# Patient Record
Sex: FEMALE | Race: WHITE | NOT HISPANIC OR LATINO | Employment: OTHER | URBAN - METROPOLITAN AREA
[De-identification: names, ages, dates, MRNs, and addresses within clinical notes are randomized per-mention and may not be internally consistent; named-entity substitution may affect disease eponyms.]

---

## 2017-01-02 DIAGNOSIS — J44.9 CHRONIC OBSTRUCTIVE PULMONARY DISEASE, UNSPECIFIED COPD TYPE: ICD-10-CM

## 2017-01-03 ENCOUNTER — TELEPHONE (OUTPATIENT)
Dept: RADIOLOGY | Facility: HOSPITAL | Age: 82
End: 2017-01-03

## 2017-01-03 ENCOUNTER — HOSPITAL ENCOUNTER (OUTPATIENT)
Dept: RADIOLOGY | Facility: HOSPITAL | Age: 82
Discharge: HOME | End: 2017-01-03
Attending: FAMILY MEDICINE
Payer: MEDICARE

## 2017-01-03 DIAGNOSIS — R53.1 WEAKNESS: ICD-10-CM

## 2017-01-03 DIAGNOSIS — R41.3 MEMORY DIFFICULTIES: ICD-10-CM
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
## 2017-01-03 PROCEDURE — 70450 CT HEAD/BRAIN W/O DYE: CPT | Mod: 26,,, | Performed by: RADIOLOGY

## 2017-01-03 PROCEDURE — 70450 CT HEAD/BRAIN W/O DYE: CPT | Mod: TC,PO

## 2017-01-04 ENCOUNTER — HOSPITAL ENCOUNTER (OUTPATIENT)
Dept: RADIOLOGY | Facility: HOSPITAL | Age: 82
Discharge: HOME | End: 2017-01-04
Attending: FAMILY MEDICINE
Payer: MEDICARE

## 2017-01-04 DIAGNOSIS — R53.1 WEAKNESS: ICD-10-CM
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
## 2017-01-04 DIAGNOSIS — R09.89 BRUIT: ICD-10-CM

## 2017-01-04 PROCEDURE — 93880 EXTRACRANIAL BILAT STUDY: CPT | Mod: TC,PO

## 2017-01-04 PROCEDURE — 93880 EXTRACRANIAL BILAT STUDY: CPT | Mod: 26,,, | Performed by: RADIOLOGY

## 2017-01-05 ENCOUNTER — TELEPHONE (OUTPATIENT)
Dept: FAMILY MEDICINE | Facility: CLINIC | Age: 82
End: 2017-01-05

## 2017-01-05 DIAGNOSIS — R41.3 MEMORY DIFFICULTIES: Primary | ICD-10-CM

## 2017-01-05 RX ORDER — MINERAL OIL
ENEMA (ML) RECTAL
Refills: 0 | OUTPATIENT
Start: 2017-01-05

## 2017-01-05 NOTE — TELEPHONE ENCOUNTER
"----- Message from Refugio Heck MD sent at 1/3/2017  4:18 PM CST -----  She does have chronic change that are at the "upper limits" (of what would be expected) for her at her age.  Her smoking history could have caused some of this.  She did not have an acute event like a stroke so I do not feel that further testing with an MRI is needed at this moment to rule out an acute stroke.  She had been having troubles before our recent visit.  I do, however, recommend that she have an evaluation by a neurologist for further workup of her falling and instability.  Please put in a referral and book the appt for her.  "

## 2017-01-27 ENCOUNTER — OFFICE VISIT (OUTPATIENT)
Dept: FAMILY MEDICINE | Facility: CLINIC | Age: 82
End: 2017-01-27
Payer: MEDICARE

## 2017-01-27 VITALS
OXYGEN SATURATION: 98 % | TEMPERATURE: 98 F | HEART RATE: 65 BPM | SYSTOLIC BLOOD PRESSURE: 138 MMHG | HEIGHT: 63 IN | WEIGHT: 128.31 LBS | DIASTOLIC BLOOD PRESSURE: 78 MMHG | BODY MASS INDEX: 22.73 KG/M2

## 2017-01-27 DIAGNOSIS — I10 ESSENTIAL HYPERTENSION: ICD-10-CM

## 2017-01-27 DIAGNOSIS — I48.0 PAROXYSMAL ATRIAL FIBRILLATION: ICD-10-CM
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
## 2017-01-27 PROCEDURE — G0008 ADMIN INFLUENZA VIRUS VAC: HCPCS | Mod: PBBFAC,PO | Performed by: FAMILY MEDICINE

## 2017-01-27 PROCEDURE — 99213 OFFICE O/P EST LOW 20 MIN: CPT | Mod: PBBFAC,PO | Performed by: FAMILY MEDICINE

## 2017-01-27 PROCEDURE — 99999 PR PBB SHADOW E&M-EST. PATIENT-LVL III: CPT | Mod: PBBFAC,,, | Performed by: FAMILY MEDICINE

## 2017-01-27 PROCEDURE — 99213 OFFICE O/P EST LOW 20 MIN: CPT | Mod: S$PBB,,, | Performed by: FAMILY MEDICINE

## 2017-01-27 NOTE — PROGRESS NOTES
"Subjective:      Patient ID: Edgardo Ashley is a 85 y.o. female.    Chief Complaint: Follow-up    HPI she is here to f/u on testing for her falls and memory.  She has not had a fall in the last month.   She had a workup.     The carotid U/S and holter were neg.     The CT showed chronic changes.      The patient presents with essential hypertension.  The patient is tolerating the medication well and is in excellent compliance.  The patient is experiencing no side effects.  Counseling was offered regarding low salt diets.  The patient has a reduced salt intake.  The patient denies chest pain, palpitations, shortness of breath, dyspnea on exertion, left or murmur neck pain, nausea, vomiting, diaphoresis, paroxysmal nocturnal dyspnea, and orthopnea.     Visit Vitals    /78    Pulse 65    Temp 98.1 °F (36.7 °C) (Oral)    Ht 5' 3" (1.6 m)    Wt 58.2 kg (128 lb 4.9 oz)    SpO2 98%    BMI 22.73 kg/m2       Review of Systems    Objective:     Visit Vitals    /78    Pulse 65    Temp 98.1 °F (36.7 °C) (Oral)    Ht 5' 3" (1.6 m)    Wt 58.2 kg (128 lb 4.9 oz)    SpO2 98%    BMI 22.73 kg/m2       Physical Exam   Constitutional: She appears well-developed and well-nourished. No distress.   Cardiovascular: Normal rate, regular rhythm and intact distal pulses.  Exam reveals no gallop and no friction rub.    Murmur heard.  Pulmonary/Chest: Effort normal and breath sounds normal. No respiratory distress. She has no wheezes. She has no rales.   Musculoskeletal: She exhibits no edema.   Skin: She is not diaphoretic.       Assessment:     1. Fall, subsequent encounter    2. Paroxysmal atrial fibrillation    3. Essential hypertension        Plan:   Edgardo was seen today for follow-up.    Diagnoses and all orders for this visit:    Fall, subsequent encounter    Paroxysmal atrial fibrillation    Essential hypertension    Other orders  -     Influenza - High Dose (65+) (PF) (IM)      She is going to think about going " to PT for balance and strengthening but she does not want to do that now.

## 2017-03-30 ENCOUNTER — LAB VISIT (OUTPATIENT)
Dept: LAB | Facility: HOSPITAL | Age: 82
End: 2017-03-30
Attending: FAMILY MEDICINE
Payer: MEDICARE

## 2017-03-30 DIAGNOSIS — E78.5 HYPERLIPIDEMIA, UNSPECIFIED HYPERLIPIDEMIA TYPE: ICD-10-CM

## 2017-03-30 LAB
ALBUMIN SERPL BCP-MCNC: 3.6 G/DL
ALP SERPL-CCNC: 99 U/L
ALT SERPL W/O P-5'-P-CCNC: 13 U/L
AST SERPL-CCNC: 20 U/L
BILIRUB DIRECT SERPL-MCNC: 0.3 MG/DL
BILIRUB SERPL-MCNC: 0.8 MG/DL
CHOLEST/HDLC SERPL: 3.4 {RATIO}
HDL/CHOLESTEROL RATIO: 29.4 %
HDLC SERPL-MCNC: 143 MG/DL
HDLC SERPL-MCNC: 42 MG/DL
LDLC SERPL CALC-MCNC: 78.8 MG/DL
NONHDLC SERPL-MCNC: 101 MG/DL
PROT SERPL-MCNC: 6.5 G/DL
TRIGL SERPL-MCNC: 111 MG/DL

## 2017-03-30 PROCEDURE — 36415 COLL VENOUS BLD VENIPUNCTURE: CPT | Mod: PO

## 2017-03-30 PROCEDURE — 80061 LIPID PANEL: CPT

## 2017-03-30 PROCEDURE — 80076 HEPATIC FUNCTION PANEL: CPT

## 2017-04-03 NOTE — PROGRESS NOTES
The patient's lipid and liver are at a controlled target on the labs that I reviewed.   Repeat a LIPID AND LIVER PROFILE in 12 months.  Refill the lipid medications for a year for the patient.

## 2017-05-15 DIAGNOSIS — J44.9 CHRONIC OBSTRUCTIVE PULMONARY DISEASE, UNSPECIFIED COPD TYPE: ICD-10-CM

## 2017-05-15 RX ORDER — MINERAL OIL
180 ENEMA (ML) RECTAL DAILY
Qty: 90 TABLET | Refills: 3 | Status: SHIPPED | OUTPATIENT
Start: 2017-05-15 | End: 2017-05-24 | Stop reason: SDUPTHER

## 2017-05-24 DIAGNOSIS — J44.9 CHRONIC OBSTRUCTIVE PULMONARY DISEASE, UNSPECIFIED COPD TYPE: ICD-10-CM

## 2017-05-24 RX ORDER — MINERAL OIL
180 ENEMA (ML) RECTAL DAILY
Qty: 90 TABLET | Refills: 3 | Status: SHIPPED | OUTPATIENT
Start: 2017-05-24 | End: 2018-05-29 | Stop reason: SDUPTHER

## 2017-06-14 DIAGNOSIS — I48.0 PAROXYSMAL ATRIAL FIBRILLATION: ICD-10-CM

## 2017-06-14 RX ORDER — DIGOXIN 250 MCG
TABLET ORAL
Qty: 90 TABLET | Refills: 2 | Status: SHIPPED | OUTPATIENT
Start: 2017-06-14 | End: 2017-12-27 | Stop reason: SDUPTHER

## 2017-10-18 DIAGNOSIS — E78.5 HYPERLIPIDEMIA, UNSPECIFIED HYPERLIPIDEMIA TYPE: ICD-10-CM

## 2017-10-18 RX ORDER — LOVASTATIN 20 MG/1
TABLET ORAL
Qty: 90 TABLET | Refills: 1 | Status: SHIPPED | OUTPATIENT
Start: 2017-10-18 | End: 2017-12-27 | Stop reason: SDUPTHER

## 2017-12-27 ENCOUNTER — OFFICE VISIT (OUTPATIENT)
Dept: FAMILY MEDICINE | Facility: CLINIC | Age: 82
End: 2017-12-27
Payer: MEDICARE

## 2017-12-27 ENCOUNTER — LAB VISIT (OUTPATIENT)
Dept: LAB | Facility: HOSPITAL | Age: 82
End: 2017-12-27
Attending: FAMILY MEDICINE
Payer: MEDICARE

## 2017-12-27 VITALS
BODY MASS INDEX: 22.4 KG/M2 | SYSTOLIC BLOOD PRESSURE: 128 MMHG | HEIGHT: 64 IN | DIASTOLIC BLOOD PRESSURE: 82 MMHG | HEART RATE: 55 BPM | TEMPERATURE: 98 F | WEIGHT: 131.19 LBS

## 2017-12-27 DIAGNOSIS — N18.30 CKD (CHRONIC KIDNEY DISEASE), STAGE III: ICD-10-CM

## 2017-12-27 DIAGNOSIS — J44.9 CHRONIC OBSTRUCTIVE PULMONARY DISEASE, UNSPECIFIED COPD TYPE: ICD-10-CM

## 2017-12-27 DIAGNOSIS — E55.9 VITAMIN D INSUFFICIENCY: ICD-10-CM

## 2017-12-27 DIAGNOSIS — I48.0 PAROXYSMAL ATRIAL FIBRILLATION: ICD-10-CM

## 2017-12-27 DIAGNOSIS — I10 ESSENTIAL HYPERTENSION: ICD-10-CM

## 2017-12-27 DIAGNOSIS — E78.5 HYPERLIPIDEMIA, UNSPECIFIED HYPERLIPIDEMIA TYPE: ICD-10-CM

## 2017-12-27 DIAGNOSIS — M81.0 OSTEOPOROSIS, SENILE: Primary | ICD-10-CM
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
## 2017-12-27 DIAGNOSIS — M81.0 OSTEOPOROSIS, SENILE: ICD-10-CM

## 2017-12-27 LAB
25(OH)D3+25(OH)D2 SERPL-MCNC: 24 NG/ML
ALBUMIN SERPL BCP-MCNC: 3.5 G/DL
ALP SERPL-CCNC: 118 U/L
ALT SERPL W/O P-5'-P-CCNC: 12 U/L
ANION GAP SERPL CALC-SCNC: 7 MMOL/L
AST SERPL-CCNC: 18 U/L
BILIRUB SERPL-MCNC: 0.9 MG/DL
BUN SERPL-MCNC: 14 MG/DL
CALCIUM SERPL-MCNC: 9.3 MG/DL
CHLORIDE SERPL-SCNC: 105 MMOL/L
CHOLEST SERPL-MCNC: 150 MG/DL
CHOLEST/HDLC SERPL: 3.1 {RATIO}
CO2 SERPL-SCNC: 26 MMOL/L
CREAT SERPL-MCNC: 1.1 MG/DL
DIGOXIN SERPL-MCNC: 1 NG/ML
EST. GFR  (AFRICAN AMERICAN): 52.5 ML/MIN/1.73 M^2
EST. GFR  (NON AFRICAN AMERICAN): 45.6 ML/MIN/1.73 M^2
GLUCOSE SERPL-MCNC: 76 MG/DL
HDLC SERPL-MCNC: 48 MG/DL
HDLC SERPL: 32 %
LDLC SERPL CALC-MCNC: 84.2 MG/DL
NONHDLC SERPL-MCNC: 102 MG/DL
POTASSIUM SERPL-SCNC: 4.2 MMOL/L
PROT SERPL-MCNC: 6.6 G/DL
SODIUM SERPL-SCNC: 138 MMOL/L
TRIGL SERPL-MCNC: 89 MG/DL

## 2017-12-27 PROCEDURE — 99214 OFFICE O/P EST MOD 30 MIN: CPT | Mod: S$PBB,,, | Performed by: FAMILY MEDICINE

## 2017-12-27 PROCEDURE — 99999 PR PBB SHADOW E&M-EST. PATIENT-LVL III: CPT | Mod: PBBFAC,,, | Performed by: FAMILY MEDICINE

## 2017-12-27 PROCEDURE — 36415 COLL VENOUS BLD VENIPUNCTURE: CPT | Mod: PO

## 2017-12-27 PROCEDURE — 80053 COMPREHEN METABOLIC PANEL: CPT

## 2017-12-27 PROCEDURE — 82306 VITAMIN D 25 HYDROXY: CPT

## 2017-12-27 PROCEDURE — 99213 OFFICE O/P EST LOW 20 MIN: CPT | Mod: PBBFAC,PO | Performed by: FAMILY MEDICINE

## 2017-12-27 PROCEDURE — 80061 LIPID PANEL: CPT

## 2017-12-27 PROCEDURE — 80162 ASSAY OF DIGOXIN TOTAL: CPT

## 2017-12-27 RX ORDER — LOVASTATIN 20 MG/1
20 TABLET ORAL NIGHTLY
Qty: 90 TABLET | Refills: 3 | Status: SHIPPED | OUTPATIENT
Start: 2017-12-27 | End: 2018-02-23

## 2017-12-27 RX ORDER — FLUTICASONE PROPIONATE AND SALMETEROL 500; 50 UG/1; UG/1
1 POWDER RESPIRATORY (INHALATION) 2 TIMES DAILY
Qty: 180 EACH | Refills: 3 | Status: SHIPPED | OUTPATIENT
Start: 2017-12-27

## 2017-12-27 RX ORDER — DIGOXIN 250 MCG
0.25 TABLET ORAL DAILY
Qty: 90 TABLET | Refills: 3 | Status: SHIPPED | OUTPATIENT
Start: 2017-12-27

## 2017-12-27 RX ORDER — METOPROLOL TARTRATE 25 MG/1
25 TABLET, FILM COATED ORAL DAILY
Qty: 90 TABLET | Refills: 3 | Status: SHIPPED | OUTPATIENT
Start: 2017-12-27 | End: 2018-06-22 | Stop reason: SDUPTHER

## 2017-12-27 RX ORDER — TETANUS TOXOID, REDUCED DIPHTHERIA TOXOID AND ACELLULAR PERTUSSIS VACCINE, ADSORBED 5; 2.5; 8; 8; 2.5 [IU]/.5ML; [IU]/.5ML; UG/.5ML; UG/.5ML; UG/.5ML
SUSPENSION INTRAMUSCULAR
COMMUNITY
Start: 2017-09-23 | End: 2018-06-22

## 2017-12-27 NOTE — PROGRESS NOTES
Subjective:      Patient ID: Edgardo Ashley is a 86 y.o. female.    Chief Complaint: Medication Refill    HPI  Problem List Items Addressed This Visit     Atrial fibrillation    Overview     She has chronic Afib and is not on anticoagulation due to risk of falls.  She has been stable for years on her digoxin and is rate controlled.  She gets the levels checked at times.          Relevant Medications    digoxin (LANOXIN) 250 mcg tablet    Other Relevant Orders    Digoxin level    CKD (chronic kidney disease), stage III    Overview     She has ckd and used to see Dr. Goodwin.   Lab Results   Component Value Date    CREATININE 1.1 12/21/2016    BUN 16 12/21/2016     12/21/2016    K 4.4 12/21/2016     12/21/2016    CO2 26 12/21/2016     CMP  Sodium   Date Value Ref Range Status   12/21/2016 140 136 - 145 mmol/L Final     Potassium   Date Value Ref Range Status   12/21/2016 4.4 3.5 - 5.1 mmol/L Final     Chloride   Date Value Ref Range Status   12/21/2016 107 95 - 110 mmol/L Final     CO2   Date Value Ref Range Status   12/21/2016 26 23 - 29 mmol/L Final     Glucose   Date Value Ref Range Status   12/21/2016 85 70 - 110 mg/dL Final     BUN, Bld   Date Value Ref Range Status   12/21/2016 16 8 - 23 mg/dL Final     Creatinine   Date Value Ref Range Status   12/21/2016 1.1 0.5 - 1.4 mg/dL Final     Calcium   Date Value Ref Range Status   12/21/2016 9.2 8.7 - 10.5 mg/dL Final     Total Protein   Date Value Ref Range Status   03/30/2017 6.5 6.0 - 8.4 g/dL Final     Albumin   Date Value Ref Range Status   03/30/2017 3.6 3.5 - 5.2 g/dL Final     Total Bilirubin   Date Value Ref Range Status   03/30/2017 0.8 0.1 - 1.0 mg/dL Final     Comment:     For infants and newborns, interpretation of results should be based  on gestational age, weight and in agreement with clinical  observations.  Premature Infant recommended reference ranges:  Up to 24 hours.............<8.0 mg/dL  Up to 48 hours............<12.0 mg/dL  3-5  days..................<15.0 mg/dL  6-29 days.................<15.0 mg/dL       Alkaline Phosphatase   Date Value Ref Range Status   03/30/2017 99 55 - 135 U/L Final     AST   Date Value Ref Range Status   03/30/2017 20 10 - 40 U/L Final     ALT   Date Value Ref Range Status   03/30/2017 13 10 - 44 U/L Final     Anion Gap   Date Value Ref Range Status   12/21/2016 7 (L) 8 - 16 mmol/L Final     eGFR if    Date Value Ref Range Status   12/21/2016 52.9 (A) >60 mL/min/1.73 m^2 Final     eGFR if non    Date Value Ref Range Status   12/21/2016 45.9 (A) >60 mL/min/1.73 m^2 Final     Comment:     Calculation used to obtain the estimated glomerular filtration  rate (eGFR) is the CKD-EPI equation. Since race is unknown   in our information system, the eGFR values for   -American and Non--American patients are given   for each creatinine result.                Relevant Orders    Comprehensive metabolic panel    COPD (chronic obstructive pulmonary disease)    Overview     The patient presents with COPD.  The patient denies chest pain, palpitations, shortness of breath, difficulty breathing, and wheezing.  The patient feels that the pattern of symptoms is stable.  Current treatment has included advair daily and spiriva and she has albuterol but never uses this.  the insurance is not going to cover her spiriva and needs to do step therapy.    .          Relevant Medications    fluticasone-salmeterol 500-50 mcg/dose (ADVAIR DISKUS) 500-50 mcg/dose DsDv diskus inhaler    Fall    Overview     She did have a fall in July when she had a foot that was asleep and she got up and stepped wrong.  She did not hurt herself.  She uses a walker -prn and now lives with her daughter.         Hyperlipidemia    Overview     The patient presents with hyperlipidemia.  The patient reports tolerating the medication well and is in excellent compliance.  There have been no medication side effects.  The  "patient denies chest pain, neuropathy, and myalgias.  The patient has reduced fat intake and has been exercising.  Current treatment has included the medications listed in the med card.    Lab Results   Component Value Date    CHOL 143 03/30/2017    CHOL 145 09/30/2016    CHOL 143 03/29/2016       Lab Results   Component Value Date    HDL 42 03/30/2017    HDL 44 09/30/2016    HDL 41 03/29/2016       Lab Results   Component Value Date    LDLCALC 78.8 03/30/2017    LDLCALC 79.4 09/30/2016    LDLCALC 78.2 03/29/2016       Lab Results   Component Value Date    TRIG 111 03/30/2017    TRIG 108 09/30/2016    TRIG 119 03/29/2016       Lab Results   Component Value Date    CHOLHDL 29.4 03/30/2017    CHOLHDL 30.3 09/30/2016    CHOLHDL 28.7 03/29/2016     Lab Results   Component Value Date    ALT 13 03/30/2017    AST 20 03/30/2017    ALKPHOS 99 03/30/2017    BILITOT 0.8 03/30/2017              Relevant Medications    lovastatin (MEVACOR) 20 MG tablet    Other Relevant Orders    Lipid panel    Hypertension    Overview     The patient presents with essential hypertension.  The patient is tolerating the medication well and is in excellent compliance.  The patient is experiencing no side effects.  Counseling was offered regarding low salt diets.  The patient has a reduced salt intake.  The patient denies chest pain, palpitations, shortness of breath, dyspnea on exertion, left or murmur neck pain, nausea, vomiting, diaphoresis, paroxysmal nocturnal dyspnea, and orthopnea.   /82   Pulse (!) 55   Temp 98.3 °F (36.8 °C) (Oral)   Ht 5' 4" (1.626 m)   Wt 59.5 kg (131 lb 2.8 oz)   BMI 22.52 kg/m²            Relevant Medications    metoprolol tartrate (LOPRESSOR) 25 MG tablet    Other Relevant Orders    Comprehensive metabolic panel    Osteoporosis, senile - Primary    Overview     The patient presents with osteoporosis.  The patient denies back pain, bone pain, hip pain and has not had prior fractures.  The patient has not " been performing weight bearing exercises.  Current treatment has not included medications for this condition.  The patient has had a bone density in the past and it is not due to be performed at this time.  She was on fosamax in the past and she did not want to be on it so she quit it in the past and she does not want to get a repeat DEXA as she refuses treatment for the future.            Relevant Orders    Vitamin D    Vitamin D insufficiency    Overview     She has vitamin D deficiency and she is not on much supplementation.               Her insurance would only cover anoro or bevespi and they are not a good switch for the spiriva so I have asked her to just go to bid on the advair and we will see if this will keep her stable.  Stop the spiriva.    Health Maintenance Due   Topic Date Due    Zoster Vaccine  09/01/1991       Past Medical History:  Past Medical History:   Diagnosis Date    *Atrial fibrillation     Allergy     COPD (chronic obstructive pulmonary disease)     Heart murmur 6/17/2015    Hyperlipidemia LDL goal < 130     Hypertension     Osteoporosis      Past Surgical History:   Procedure Laterality Date    ANTERIOR COMPARTMENT DECOMPRESSION      HYSTERECTOMY       Review of patient's allergies indicates:   Allergen Reactions    No known drug allergies      Current Outpatient Prescriptions on File Prior to Visit   Medication Sig Dispense Refill    albuterol 90 mcg/actuation inhaler Inhale 2 puffs into the lungs every 6 (six) hours as needed for Wheezing. 3 Inhaler 3    aspirin 81 mg Tab Every day      CALCIUM CARBONATE (CALCIUM 600 ORAL) Every day      digoxin (LANOXIN) 250 mcg tablet TAKE ONE TABLET BY MOUTH DAILY 90 tablet 2    docusate sodium (COLACE) 100 MG capsule Take 100 mg by mouth 3 (three) times daily as needed.       fexofenadine (ALLEGRA ALLERGY) 180 MG tablet Take 1 tablet (180 mg total) by mouth once daily. 90 tablet 3    fluticasone-salmeterol 500-50 mcg/dose (ADVAIR  "DISKUS) 500-50 mcg/dose DsDv diskus inhaler Inhale 1 puff into the lungs 2 (two) times daily. (Patient taking differently: Inhale 1 puff into the lungs once daily. ) 180 each 3    lovastatin (MEVACOR) 20 MG tablet TAKE ONE TABLET BY MOUTH NIGHTLY 90 tablet 1    metoprolol tartrate (LOPRESSOR) 25 MG tablet Take 1 tablet (25 mg total) by mouth once daily. 90 tablet 3    tiotropium (SPIRIVA WITH HANDIHALER) 18 mcg inhalation capsule INHALE 1 CAPSULE VIA HANDIHALER ONCE EVERY DAY 90 capsule 3    vitamin E 400 UNIT capsule Every day       No current facility-administered medications on file prior to visit.      Social History     Social History    Marital status:      Spouse name: N/A    Number of children: N/A    Years of education: N/A     Occupational History    retired      Social History Main Topics    Smoking status: Never Smoker    Smokeless tobacco: Never Used    Alcohol use No    Drug use: No    Sexual activity: Not on file     Other Topics Concern    Not on file     Social History Narrative    No narrative on file     Family History   Problem Relation Age of Onset    Heart disease Mother     Arthritis Mother     Heart disease Father            Review of Systems   Constitutional: Negative for fatigue, fever and unexpected weight change.   HENT: Positive for ear pain and postnasal drip. Negative for congestion and sore throat.    Eyes: Negative for visual disturbance.   Respiratory: Negative for cough, chest tightness, shortness of breath and wheezing.    Cardiovascular: Negative for chest pain, palpitations and leg swelling.   Gastrointestinal: Negative for abdominal pain, blood in stool, constipation, diarrhea, nausea and vomiting.   Genitourinary: Negative for dysuria and hematuria.   Neurological: Negative for weakness and numbness.       Objective:   /82   Pulse (!) 55   Temp 98.3 °F (36.8 °C) (Oral)   Ht 5' 4" (1.626 m)   Wt 59.5 kg (131 lb 2.8 oz)   BMI 22.52 kg/m² "     Physical Exam   Constitutional: She appears well-developed and well-nourished. She is cooperative.   HENT:   Head: Normocephalic and atraumatic.   Right Ear: Tympanic membrane, external ear and ear canal normal.   Left Ear: Tympanic membrane, external ear and ear canal normal.   Nose: Nose normal.   Mouth/Throat: Uvula is midline and mucous membranes are normal. No oral lesions. No oropharyngeal exudate, posterior oropharyngeal edema or posterior oropharyngeal erythema.   Eyes: EOM and lids are normal. Pupils are equal, round, and reactive to light. Right eye exhibits no discharge. Left eye exhibits no discharge. Right conjunctiva is not injected. Right conjunctiva has no hemorrhage. Left conjunctiva is not injected. Left conjunctiva has no hemorrhage. No scleral icterus. Right eye exhibits no nystagmus. Left eye exhibits no nystagmus.   Neck: Normal range of motion and full passive range of motion without pain. Neck supple. No JVD present. No tracheal tenderness present. Carotid bruit is not present. No tracheal deviation present. No thyroid mass and no thyromegaly present.   Cardiovascular: Normal rate, regular rhythm, S1 normal and S2 normal.    Murmur heard.  Pulses:       Carotid pulses are 2+ on the right side, and 2+ on the left side.       Radial pulses are 2+ on the right side, and 2+ on the left side.        Posterior tibial pulses are 2+ on the right side, and 2+ on the left side.   Pulmonary/Chest: Effort normal and breath sounds normal. No respiratory distress. She has no wheezes. She has no rhonchi. She has no rales.   Abdominal: Soft. Normal appearance, normal aorta and bowel sounds are normal. She exhibits no distension, no abdominal bruit, no pulsatile midline mass and no mass. There is no hepatosplenomegaly. There is no tenderness. There is no rebound.   Musculoskeletal:        Right knee: She exhibits no swelling. No tenderness found.        Left knee: She exhibits no swelling. No tenderness  found.   Lymphadenopathy:        Head (right side): No submental and no submandibular adenopathy present.        Head (left side): No submental and no submandibular adenopathy present.     She has no cervical adenopathy.   Neurological: She is alert. She has normal strength. No cranial nerve deficit or sensory deficit.   Skin: Skin is warm and dry. No rash noted. No cyanosis. Nails show no clubbing.   Psychiatric: She has a normal mood and affect. Her speech is normal and behavior is normal. Thought content normal. Cognition and memory are normal.       Assessment:     1. Osteoporosis, senile    2. Chronic obstructive pulmonary disease, unspecified COPD type    3. Essential hypertension    4. Hyperlipidemia, unspecified hyperlipidemia type    5. Paroxysmal atrial fibrillation    6. Vitamin D insufficiency    7. CKD (chronic kidney disease), stage III    8. Fall, subsequent encounter        Plan:   Edgardo was seen today for medication refill.    Diagnoses and all orders for this visit:    Osteoporosis, senile  -     Vitamin D; Future    Chronic obstructive pulmonary disease, unspecified COPD type  -     fluticasone-salmeterol 500-50 mcg/dose (ADVAIR DISKUS) 500-50 mcg/dose DsDv diskus inhaler; Inhale 1 puff into the lungs 2 (two) times daily.    Essential hypertension  -     metoprolol tartrate (LOPRESSOR) 25 MG tablet; Take 1 tablet (25 mg total) by mouth once daily.  -     Comprehensive metabolic panel; Future    Hyperlipidemia, unspecified hyperlipidemia type  -     lovastatin (MEVACOR) 20 MG tablet; Take 1 tablet (20 mg total) by mouth nightly.  -     Lipid panel; Future    Paroxysmal atrial fibrillation  -     digoxin (LANOXIN) 250 mcg tablet; Take 1 tablet (0.25 mg total) by mouth once daily.  -     Digoxin level; Future    Vitamin D insufficiency    CKD (chronic kidney disease), stage III  -     Comprehensive metabolic panel; Future    Fall, subsequent encounter      Use a walker as much as possible.

## 2017-12-28 NOTE — PROGRESS NOTES
The digoxin level is normal.  The electrolytes also show that the kidney function is still low but not really changed fro last year.  Based on this, cont meds.    The vit d is low so please start vit D 50,000 u a week #4 and rf x 11 and recheck in 1 year.     The lipid is fine.  Cont meds and recheck in 1 year.

## 2017-12-29 ENCOUNTER — TELEPHONE (OUTPATIENT)
Dept: FAMILY MEDICINE | Facility: CLINIC | Age: 82
End: 2017-12-29

## 2017-12-29 RX ORDER — ERGOCALCIFEROL 1.25 MG/1
50000 CAPSULE ORAL
COMMUNITY
Start: 2018-01-01 | End: 2018-01-02 | Stop reason: SDUPTHER

## 2017-12-29 NOTE — TELEPHONE ENCOUNTER
----- Message from Refugio Heck MD sent at 12/28/2017  2:28 PM CST -----  The digoxin level is normal.  The electrolytes also show that the kidney function is still low but not really changed fro last year.  Based on this, cont meds.    The vit d is low so please start vit D 50,000 u a week #4 and rf x 11 and recheck in 1 year.     The lipid is fine.  Cont meds and recheck in 1 year.

## 2018-01-02 RX ORDER — ERGOCALCIFEROL 1.25 MG/1
50000 CAPSULE ORAL
Qty: 4 CAPSULE | Refills: 11 | Status: SHIPPED | OUTPATIENT
Start: 2018-01-02

## 2018-01-17 ENCOUNTER — NURSE TRIAGE (OUTPATIENT)
Dept: ADMINISTRATIVE | Facility: CLINIC | Age: 83
End: 2018-01-17

## 2018-01-17 RX ORDER — CODEINE PHOSPHATE AND GUAIFENESIN 10; 100 MG/5ML; MG/5ML
10 SOLUTION ORAL 4 TIMES DAILY PRN
Qty: 240 ML | Refills: 0 | Status: SHIPPED | OUTPATIENT
Start: 2018-01-17 | End: 2018-01-27

## 2018-01-17 NOTE — TELEPHONE ENCOUNTER
I have signed for the following orders AND/OR meds.  Please call the patient and ask the patient to schedule the testing AND/OR inform about any medications that were sent.      No orders of the defined types were placed in this encounter.        Medications Ordered This Encounter      guaifenesin-codeine 100-10 mg/5 ml (TUSSI-ORGANIDIN NR)  mg/5 mL syrup          Sig: Take 10 mLs by mouth 4 (four) times daily as needed for Cough.          Dispense:  240 mL          Refill:  0

## 2018-01-17 NOTE — TELEPHONE ENCOUNTER
Reason for Disposition   Known COPD or other severe lung disease (i.e., bronchiectasis, cystic fibrosis, lung surgery) and worsening symptoms (i.e., increased sputum purulence or amount, increased breathing difficulty)    Protocols used: ST COUGH-A-OH

## 2018-01-19 ENCOUNTER — TELEPHONE (OUTPATIENT)
Dept: FAMILY MEDICINE | Facility: CLINIC | Age: 83
End: 2018-01-19

## 2018-01-19 RX ORDER — BENZONATATE 200 MG/1
200 CAPSULE ORAL 3 TIMES DAILY PRN
Qty: 30 CAPSULE | Refills: 0 | Status: SHIPPED | OUTPATIENT
Start: 2018-01-19 | End: 2018-02-23

## 2018-01-19 NOTE — TELEPHONE ENCOUNTER
I have signed for the following orders AND/OR meds.  Please call the patient and ask the patient to schedule the testing AND/OR inform about any medications that were sent.      No orders of the defined types were placed in this encounter.        Medications Ordered This Encounter      benzonatate (TESSALON) 200 MG capsule          Sig: Take 1 capsule (200 mg total) by mouth 3 (three) times daily as needed for Cough.          Dispense:  30 capsule          Refill:  0

## 2018-01-19 NOTE — TELEPHONE ENCOUNTER
----- Message from Denise Hernandes sent at 1/19/2018 11:55 AM CST -----  Contact: [phone]   Pt would like something called in for a cough.  Please call back at [phone]. x-           Pt Uses:  .  [facility] [place] [id number]  [id number]  [place] [address]  Phone: [phone] Fax: [phone]

## 2018-01-25 ENCOUNTER — TELEPHONE (OUTPATIENT)
Dept: FAMILY MEDICINE | Facility: CLINIC | Age: 83
End: 2018-01-25

## 2018-01-25 NOTE — TELEPHONE ENCOUNTER
----- Message from Elizabeth Puri sent at 1/25/2018  9:09 AM CST -----  Contact: Pt Daughter  Please call pt daughter at [phone]. Pt is weak and confused.

## 2018-01-31 ENCOUNTER — TELEPHONE (OUTPATIENT)
Dept: FAMILY MEDICINE | Facility: CLINIC | Age: 83
End: 2018-01-31

## 2018-01-31 NOTE — TELEPHONE ENCOUNTER
I have obtained and reviewed the notes.     Discharge Summaries  - in this encounter    Chelle Shields NP - 01/29/2018 10:27 AM CST  Formatting of this note may be different from the original.  [facility] DISCHARGE SUMMARY    Patient ID:  Edgardo Ashley  [id number]  86 y.o.  9/1/1931    Admit Date:   1/25/2018 11:19 AM    Discharge Date:   Discharge Today: 1/29/2018    Admitting Physician:   Katty Null MD     Discharge Physician:   CHELLE SHIELDS NP    Consults:  Consultants   Provider Service Role Specialty   Isabela Diaz MD Cardiology Consulting Physician Interventional Cardiology   Chelle Shields NP - Nurse Practitioner Nurse Practitioner Acute Care   Tracy Mireles MD - Consulting Physician Neurology       Reason for Admission/Admission Diagnoses:   Present on Admission:   Acute ischemic stroke    Discharge Diagnoses:   Active Hospital Problems   Diagnosis Date Noted    Acute ischemic stroke 01/25/2018     Resolved Hospital Problems   Diagnosis Date Noted Date Resolved     History of Present Illness:   HPI      Ms. Ashley is a 86-year-old lady with a history of atrial fibrillation and COPD presents to the emergency department secondary to altered mentation and a fall. History is limited from the patient as she has some change in her mental status. According to her daughter was present at the bedside she reports that her mother has been having symptoms of depression, forgetfulness, episodes of crying over the past 2 weeks that has gotten progressively worse over the past few days.      She states today that the patient complained of not being able to see. When she was asked to be seated at the table to eat, she slid to the floor. However afterwards the patient reported her symptoms resolved and was able to get up and walk down the mo. Later when Ms. Ashley was in her room, her daughter reports that she told her to ask for assistance if she needed to go somewhere but instead the patient used a rolling  walker which her daughter felt contributed to her fall. Her daughter reports at baseline she does not use a walker at all. However that was the only walker at the house and since her mother's mental status had been changing recently, the daughter says she allowed her mother to sit on it while she transported her in the house.    Hospital Course and Treatment:   Discharge summary summary dictated. #[id number]  Admission Information   Date & Time  1/25/2018 Provider  Katty Null MD Department  [facility] Ortho/Neurosurgery Dept. Phone  [phone]           I discussed with the patient and the family the disease process and treatment.     I have personally seen and examined the patient, Edgardo Ashley, in a face to face encounter on the date of discharge.     She is cleared for discharge with instructions to follow up as directed.   Total time in the care and discharge planning of this patient was greater than 30 minutes.    Significant Diagnostic Studies:  Recent Imaging and Procedure Results   Procedure Component Value Ref Range Date/Time   Urine culture [[id number]] Collected: 01/27/2018 0310   Specimen: N/A from Urine CC Updated: 01/29/2018 0744   Micro Culture Result No growth 1 day   Micro Culture Result No growth 2 days   Echo Bubble Study [[id number]] Collected: 01/26/2018 0837   Updated: 01/26/2018 1530   PatientHeight 167.64 cm   PatientWeight 58.0464598 kg   Heart Rate 67 bpm   Systolic Pressure 185 mmHg   Diastolic Pressure 75 mmHg   BSA 1.06032 m^2   2D/MMode measurements and calculations: MMode 2D Measurements & Calculations   Interventricular Septum in Diastole 1.40986 cm   Interventricular Septum in Systole 1.88377 cm   Left Ventricle in Diastole 3.82079 cm   Left Ventricle in Systole 2.66391 cm   LV post wall in Diastole 1.81176 cm   LV post wall in Systole 2.12296 cm   IVS/LVPW 0.322527   FS 34.6016 %   EDV(Teich) 60.2517 ml   ESV(Teich) 21.3628 ml   EF(Teich) 64.5441  %   EDV(cubed) 52.9868 ml   ESV(cubed) 14.8207 ml   EF(cubed) 72.0294 %   % IVS Thick 5.79016 %   % LVPW Thick 45.4956 %   LV mass(C)d 207.888 grams   LV mass(C)dI 124.819 grams/m^2   LV mass(C)s 186.657 grams   LV mass(C)sI 112.072 grams/m^2   CO(Teich) 2.25288 l/min   CI(Teich) 1.64271 l/min/m^2   SV(Teich) 38.8889 ml   SI(Teich) 23.3494 ml/m^2   CO(cubed) 2.5631 l/min   CI(cubed) 1.68452 l/min/m^2   SV(cubed) 38.1661 ml   SI(cubed) 22.9155 ml/m^2   Ao root diameter 2.88973 cm   Ao root area 6.41362 cm^2   LA Dimension 4.24036 cm   LA/Ao 1.88801   LVOT Diameter 1.86219 cm   LVOT area 2.26297 cm^2   Time Measurements Time Measurements   MM R-R int 0.544339 sec   MM HR 67.1565 BPM   Doppler measurements and calculations: Doppler Measurements & Calculations   MV Vmax 156.782 cm/sec   MV MEAN PG 9.68817 mmHg   MV Vmean 90.5209 cm/sec   MV MAX PG 3.24044 mmHg   MV V2 VTI 41.6 cm   MVA(VTI) 1.71267 cm^2   Ao V2 Max 303.657 cm/sec   Ao max PG 37.0373 mmHg   Ao max PG (full) 31.088 mmHg   Ao V2 mean 235.468 cm/sec   Ao mean PG 23.9293 mmHg   Ao mean PG (full) 20.239 mmHg   Ao V2 VTI 60.372 cm   OSITO(I,A) 0.772253 cm^2   OSITO(I,D) 0.329258 cm^2   OSITO(V,A) 1.1246 cm^2   OSITO(V,D) 1.1246 cm^2   LV V1 max PG 5.75858 mmHg   LV V1 mean PG 3.13346 mmHg   LV V1 max 121.955 cm/sec   LV V1 mean 92.3107 cm/sec   LV V1 VTI 21.343 cm   SV(Ao) 418.924 ml   SI(Ao) 251.528 ml/m^2   SV(LVOT) 59.7635 ml   SI(LVOT) 35.8829 ml/m^2   TR max ziggy 246.04 cm/sec   RVSP (TR) 39.4569 mmHg   RAP systole 15 mmHg   EF MIN = 65 %   EF MAX = 75 %   Narrative:       [facility]  [address]  [place] [address]  [phone]    Adult Echocardiogram  Name: LUCINDA ESPINOSA Study Date: 2018  MRN: [id number] Age: 86 yrs  Patient Location: [facility]\S\[id number]\S\[id number] Height: 66 in  : 1931 Weight: 130 lb  Gender: Female BSA: 1.7 m2  Reason For Study: Stroke  History: stroke  BP: 185/75 mmHg    Ordering Physician: RAIZA BARTON  Performed By:  [facility]    Interpretation Summary  The left ventricle is hyperdynamic.  Ejection Fraction = 65-75%.  There is mild asymmetric left ventricular hypertrophy.  The echo findings are consistent with hypertrophic cardiomyopathy.  The echo findings are consistent with left ventricular outflow obstruction.    Measurements with Normals  IVSd: 1.4 cm (0.6-1.1 cm) LVIDd: 3.8 cm (3.7-5.6 cm)  LVPWd: 1.6 cm (0.6-1.1 cm)  IVSs: 1.5 cm (0.8-2.0 cm) LVIDs: 2.5 cm (2.3-3.9 cm)  LVPWs: 2.3 cm (0.6-1.1 cm)  Ao root diam: (2.0-3.7 cm)  3.0 cm  LVOT diam: 1.9 cm (2.5 cm) LA dimension: 4.5 cm(1.9-4.0 cm)    MMode/2D Measurements \T\ Calculations  FS: 34.6 % % IVS thick: 5.8 %  EDV(Teich): 60.3 ml  ESV(Teich): 21.4 ml  EF(Teich): 64.5 %  Ao root area: 6.9 cm2 LVOT area: 2.8 cm2    Time Measurements  MM R-R int: 0.89 sec  MM HR: 67.2 BPM    Doppler Measurements \T\ Calculations  MV V2 max: 156.8 cm/sec Ao V2 max: 303.7 cm/sec  MV max P.8 mmHg Ao max P.0 mmHg  MV V2 mean: 90.5 cm/sec Ao V2 mean: 235.5 cm/sec  MV mean P.0 mmHg Ao mean P.9 mmHg  MV V2 VTI: 41.6 cm Ao V2 VTI: 60.4 cm  MVA(VTI): 1.4 cm2 OSITO(I,D): 0.99 cm2  OSITO(V,D): 1.1 cm2  LV V1 max P.9 mmHg SV(LVOT): 59.8 ml  LV V1 mean PG: 3.7 mmHg  LV V1 max: 122.0 cm/sec  LV V1 mean: 92.3 cm/sec  LV V1 VTI: 21.3 cm  TR max ziggy: 246.0 cm/sec RAP systole: 15.0 mmHg  TR max P.5 mmHg  RVSP(TR): 39.5 mmHg    LEFT VENTRICLE    o The left ventricle is normal in size.  o Ejection Fraction = 65-75%.  o The left ventricle is hyperdynamic.  o There is mild asymmetric left ventricular hypertrophy.  o The echo findings are consistent with hypertrophic cardiomyopathy.  o The echo findings are consistent with left ventricular outflow  obstruction.    DIASTOLOGY    o Lateral e'= '9' cm/s.  o Septal e'= '7' cm/s.    RIGHT VENTRICLE    o The right ventricle is normal size.    ATRIA    o The left atrium is mildly dilated.  o The right atrium is borderline dilated.  o A  dilated inferior vena cava suggests increased right atrial pressure.  o Bubble Study 'Negative'.    MITRAL VALVE    o Dense thickening and calcification of the entire mitral apparatus.  o There is mild mitral regurgitation.  o There is mild to moderate mitral stenosis.    TRICUSPID VALVE    o The tricuspid valve is normal.  o There is mild tricuspid regurgitation.  o RVSP= 39mm/Hg.  o There is no tricuspid stenosis.    AORTIC VALVE    o Aortic Valve calcified.  o No aortic regurgitation is present.  o AV MAX PG 37.0 mmHg.  o Moderate valvular aortic stenosis.    PULMONIC VALVE    o Pulmonary valve not well visualized.    GREAT VESSELS    o The aortic root is normal size.  o Normal size aorta.  o The pulmonary artery is not well visualized.    PERICARDIUM/PLEURAL EFFUSION    o There is no pericardial effusion.  o There is no pleural effusion.    _______________________________________________________________________________    Electronically signed by: Isabela Diaz MD 01/26/2018 03:30 PM  InterpretingPhysician: Isabela Diaz MD electronically signed on 2018-01-26 15:30:13.93   US Carotid Bilateral [[id number]] Collected: 01/25/2018 1715   Updated: 01/25/2018 1720   Narrative:   REASON FOR EXAM: Stroke     TECHNICAL FACTORS: Using a linear high-frequency vascular ultrasound transducer, transverse and longitudinal gray-scale images are obtained of the extracranial carotid system. B-mode, color Doppler and spectral Doppler images of the CCA, ICA, ECA, and   vertebral arteries are included.    TECHNOLOGIST: Kumar Petersen    COMPARISON: None    RIGHT CAROTID FINDINGS:  There is moderate atheromatous plaquing. The Doppler findings are normal.  ICA Prox PS: 62.13 cm/s   ICA Prox ED: 11.70 cm/s   ICA Mid PS: 57.14 cm/s   ICA Mid ED: 12.41 cm/s   ICA Dist PS: 62.80 cm/s   ICA Dist ED: 14.57 cm/s   CCA Mid PS: 81.02 cm/s   CCA Mid ED: 9.47 cm/s   ECA PS: 113.56 cm/s  Vert PS: 47.04 cm/s Antegrade flow.   ICA/CCA PS  Ratio: 0.8     LEFT CAROTID FINDINGS:  There is mild atheromatous plaquing. The Doppler findings are normal.  ICA Prox PS: 52.52 cm/s   ICA Prox ED: 11.34 cm/s   ICA Mid PS: 61.06 cm/s   ICA Mid ED: 16.00 cm/s   ICA Dist PS: 45.12 cm/s   ICA Dist ED: 7.98 cm/s   CCA Mid PS: 64.25 cm/s   CCA Mid ED: 9.04 cm/s   ECA PS: 71.79 cm/s  Vert PS: 50.24 cm/s Antegrade flow.   ICA/CCA PS Ratio: 1.0     Comparison of the proximal internal carotid artery diameter to the distal internal carotid artery diameter bilaterally was performed.     IMPRESSION:  Bilateral carotid atheromatous plaquing. There is less than 50% diameter reduction.    Electronically signed by Félix Green MD on 1/25/2018 5:17 PM    MRI Brain WO Contrast [[id number]] Collected: 01/25/2018 1700   Updated: 01/25/2018 1712   Narrative:   REASON FOR EXAM: possible stroke     TECHNICAL FACTORS: Sagittal T1, axial T1, axial T2, axial gradient echo, axial FLAIR, and axial diffusion weighted images were obtained of the brain.     COMPARISON: CT of 01/25/2018    FINDINGS: There is a small area of restricted diffusion measuring 8 x 3 mm within the posterior right internal capsule immediately adjacent the right thalamus.   There are confluent areas of increased T2 FLAIR signal abnormality within the subcortical and periventricular white matter compatible with moderate to severe chronic white matter ischemic disease. These mid lateral ventricles are prominent and there is   deep in the cerebral sulci compatible with generalized cerebral volume loss. There is no evidence of acute intracranial hemorrhage. There is no evidence of mass, mass effect, or midline shift. There is bilateral maxillary sinus disease and mucosal   thickening in the ethmoid air cells. There is increased signal intensity within the sphenoid sinus compatible mucosal disease. There is a small remote lacunar infarct in the right basal ganglia. There are postsurgical changes of both orbits without  acute   finding.. Normal flow voids are present.     Impression:   1. Small acute infarct within the posterior right internal capsule adjacent the right thalamus.  2. Moderate-to-severe chronic microvascular ischemic disease of the cerebral white matter.  3. Generous cerebral volume loss.  4. Maxillary, sphenoid, and ethmoid sinus disease.    Electronically signed by Jorge Portillo MD on 1/25/2018 5:09 PM    CT Head WO Contrast [[id number]] Collected: 01/25/2018 1249   Updated: 01/25/2018 1254   Narrative:   REASON FOR EXAM: Stroke     TECHNICAL FACTORS: 5 mm contiguous axial CT images were obtained from the foramen magnum to the skull vertex.     COMPARISON: None    FINDINGS: The ventricles and sulci are prominent. There is no evidence of acute intracranial hemorrhage or infarct. There is no evidence of mass, mass effect, or midline shift. Decreased attenuation is present within the periventricular white matter.   There is opacification right mastoid air cells. The visualized orbits are normal in appearance. Mucosal thickening is present in ethmoid and sphenoid sinuses. Osseous structures are unremarkable.     Impression:   1. No acute intracranial abnormality.  2. Involutional changes.  3. Moderate to severe chronic small vessel ischemic disease.   4. Paranasal sinus disease.  5. Right mastoid disease.          Electronically signed by Félix Green MD on 1/25/2018 12:51 PM        Surgical Procedures during this visit:    Patient's Condition On Discharge:   Stable  Physical Exam   Constitutional: She is oriented to person, place, and time.   Thin, elderly Caucsian female. Appears chronically ill.   HENT:   Head: Normocephalic and atraumatic.   Eyes: Pupils are equal, round, and reactive to light.   Neck: Normal range of motion. Neck supple.   Cardiovascular: Normal rate.   Murmur heard.  Chronic afib   Pulmonary/Chest: Effort normal and breath sounds normal. No respiratory distress. She has no wheezes. She  has no rales.   Abdominal: Soft. Bowel sounds are normal. She exhibits no distension. There is no tenderness.   Musculoskeletal: Normal range of motion.   Neurological: She is alert and oriented to person, place, and time.   Skin: Skin is warm and dry. Capillary refill takes less than 2 seconds.   Psychiatric: She has a normal mood and affect.   Nursing note and vitals reviewed.    Discharge Disposition:   Order for Discharge   Start Ordered   01/29/18 1024 Discharge Disposition to: IP Rehab Facility ([facility] Other) ([facility]) Once   Question: Discharge Disposition to Answer: IP Rehab Facility ([facility] Other) Comment: [facility]   01/29/18 1026   01/29/18 0000 Follow-up with PCP   01/29/18 1026   01/29/18 0000 Follow-up with: TRACY GENAO   Question: with Answer: TRACY GENAO   01/29/18 1026         Discharge Orders:  Follow-up Information   Refugio Heck MD. Schedule an appointment as soon as possible for a visit today.   Specialty: Family Medicine  Contact information:  [address] [place] [address]  [phone]        Isabela Diaz MD. Schedule an appointment as soon as possible for a visit today.   Specialties: Interventional Cardiology, Cardiology  Contact information:  [address] [place] [address]  [phone]        Tracy Genao MD. Schedule an appointment as soon as possible for a visit today.   Specialty: Neurology  Contact information:  [address] [facility]  [address]  [place] [address]  [phone]            Immunizations Administered for This Admission   No immunizations given this admission.         Medication List     START taking these medications   clopidogrel 75 mg tablet  Commonly known as: PLAVIX  Take 1 tablet (75 mg total) by mouth daily.  Start taking on: 1/30/2018      polyethylene glycol 17 gram packet  Commonly known as: MIRALAX,GLYCOLAX  Take 17 g by mouth daily.  Start taking on: 1/30/2018        CHANGE how you take these medications   ADVAIR DISKUS 500-50 mcg/dose  Dsdv  Generic drug: fluticasone-salmeterol  Inhale 1 puff into the lungs daily.  What changed: Another medication with the same name was removed. Continue taking this medication, and follow the directions you see here.      docusate sodium 100 MG capsule  Commonly known as: COLACE  Take 1 capsule (100 mg total) by mouth 3 (three) times daily.  What changed: Another medication with the same name was removed. Continue taking this medication, and follow the directions you see here.      lovastatin 40 MG tablet  Commonly known as: MEVACOR  Take 1 tablet (40 mg total) by mouth nightly.  What changed:  · medication strength  · how much to take  · Another medication with the same name was removed. Continue taking this medication, and follow the directions you see here.      metoprolol tartrate 25 MG tablet  Commonly known as: LOPRESSOR  Take 25 mg by mouth daily.  What changed: Another medication with the same name was removed. Continue taking this medication, and follow the directions you see here.      vitamin E 400 UNIT capsule  Take 400 Units by mouth daily.  What changed: Another medication with the same name was removed. Continue taking this medication, and follow the directions you see here.        CONTINUE taking these medications   calcium-vitamin D 250 (625)-125 mg-unit per tablet  Commonly known as: OSCAL  Take 1 tablet by mouth daily.      digoxin 0.25 MG tablet  Commonly known as: LANOXIN  Take 250 mcg by mouth daily.      fexofenadine 180 MG tablet  Commonly known as: ALLEGRA  Take 180 mg by mouth daily.      VITAMIN D2 50,000 unit capsule  Generic drug: ergocalciferol  Take 50,000 Units by mouth once a week.        STOP taking these medications   aspirin 81 MG chewable tablet          Where to Get Your Medications     Information about where to get these medications is not yet available   Ask your nurse or doctor about these medications  · clopidogrel 75 mg tablet  · docusate sodium 100 MG capsule  ·  lovastatin 40 MG tablet  · polyethylene glycol 17 gram packet      Discharge Orders   Future Labs/Procedures Expected by Expires   Full Resuscitation As directed   Activity: As Tolerated As directed   Activity: Per Rehab Recommendations As directed   Diet (Select type) Cardiac/Low Chol/KEEGAN; Chopped meats. Ensure daily. As directed   Questions:   Diet Type: Cardiac/Low Chol/KEEGAN   Other Restriction(s):   Liquid Consistency:   Sodium Restriction:   Fluid restriction:   Preferences: Chopped meats. Ensure daily.   Follow-up with PCP As directed   Questions:   Schedule for:   when:   Follow-up with: JORGE ALBERTO GENAO As directed   Questions:   with: JORGE ALBERTO GENAO   Schedule for:   when:   Occupational Therapy Eval and Treat As directed   Physical Therapy Eval and Treat As directed       CHELLE SHIELDS NP  [facility]           Associated attestation - Bianca Myers MD - 01/29/2018 3:57 PM CST    Patient seen and examined by me. Agree with the assessment and plan by Chelle Shields NP.    Awake in bed, no distress  RRR, S1/S2 present, + murmur  Clear to ausculation bilaterally  Soft, NT/ND, +BS  No edema    BIANCA MYERS MD  1/29/2018  3:53 PM            Medications at Time of Discharge  - as of this encounter      Medication Sig. Disp. Refills Start Date End Date   calcium-vitamin D (OSCAL) 250 (625)-125 mg-unit per tablet   Take 1 tablet by mouth daily.           clopidogrel (PLAVIX) 75 mg tablet   Take 1 tablet (75 mg total) by mouth daily.     01/30/2018     digoxin (LANOXIN) 0.25 MG tablet   Take 250 mcg by mouth daily.           docusate sodium (COLACE) 100 MG capsule   Take 1 capsule (100 mg total) by mouth 3 (three) times daily.   0 01/29/2018 02/08/2018   ergocalciferol (VITAMIN D2) 50,000 unit capsule   Take 50,000 Units by mouth once a week.           fexofenadine (ALLEGRA) 180 MG tablet   Take 180 mg by mouth daily.           fluticasone-salmeterol (ADVAIR DISKUS) 500-50 mcg/dose DsDv   Inhale  1 puff into the lungs daily.           lovastatin (MEVACOR) 40 MG tablet   Take 1 tablet (40 mg total) by mouth nightly.     01/29/2018     metoprolol tartrate (LOPRESSOR) 25 MG tablet   Take 25 mg by mouth daily.           polyethylene glycol (MIRALAX,GLYCOLAX) 17 gram packet   Take 17 g by mouth daily.   0 01/30/2018 02/02/2018   vitamin E 400 UNIT capsule   Take 400 Units by mouth daily.     07/06/2011       Ordered Prescriptions  - in this encounter      Prescription Sig. Disp. Refills Start Date End Date   polyethylene glycol (MIRALAX,GLYCOLAX) 17 gram packet   Take 17 g by mouth daily.   0 01/30/2018 02/02/2018   docusate sodium (COLACE) 100 MG capsule   Take 1 capsule (100 mg total) by mouth 3 (three) times daily.   0 01/29/2018 02/08/2018   clopidogrel (PLAVIX) 75 mg tablet   Take 1 tablet (75 mg total) by mouth daily.     01/30/2018     lovastatin (MEVACOR) 40 MG tablet   Take 1 tablet (40 mg total) by mouth nightly.     01/29/2018       Discharge Disposition  - in this encounter      Disposition Code Departure Means Destination   IP Rehab Facility ([facility] Other)     [facility]

## 2018-01-31 NOTE — TELEPHONE ENCOUNTER
----- Message from Marisela Machado sent at 1/31/2018  9:48 AM CST -----  Contact: pt   Pt calling to speak with someone in the office,,, please call pt back at [phone] (h)

## 2018-01-31 NOTE — TELEPHONE ENCOUNTER
Pt's daughter called stating pt had a stroke last week and was admitted to [facility] and now in [facility] rehab. She would like for you to view pt's records at [facility]. Pt will be discharged from rehab on 02/12/18. Pt scheduled to see you on 02/23/18.

## 2018-02-16 ENCOUNTER — TELEPHONE (OUTPATIENT)
Dept: FAMILY MEDICINE | Facility: CLINIC | Age: 83
End: 2018-02-16

## 2018-02-16 NOTE — TELEPHONE ENCOUNTER
----- Message from Carmina Dawson sent at 2/16/2018 10:53 AM CST -----  Contact: John loredo/ Desi Meza state she will be seeing the patient for ST for 5 weeks. State the patient recently had a stroke and is having some memory deficit. State the patient is not eating much. State the patient loss 60 lbs in the last two years. State they will be working on some feeding strategies to help the patient put on a little weight. Please adv/call [phone].//thanks. cw

## 2018-02-19 NOTE — TELEPHONE ENCOUNTER
I agree with all of that  But she soul also have a visit with my nurse practitioner to workup weight loss and find other reasons for the weight loss.  Book with Jona.

## 2018-02-19 NOTE — TELEPHONE ENCOUNTER
Called pt to schedule an appt with Jona and Pt confirmed that an appt with Dr Heck was already scheduled with Dr Heck on Friday 2/23/2018.

## 2018-02-20 ENCOUNTER — PATIENT OUTREACH (OUTPATIENT)
Dept: ADMINISTRATIVE | Facility: CLINIC | Age: 83
End: 2018-02-20

## 2018-02-20 RX ORDER — CLOPIDOGREL BISULFATE 75 MG/1
75 TABLET ORAL DAILY
COMMUNITY
End: 2018-02-23

## 2018-02-20 RX ORDER — POLYETHYLENE GLYCOL 3350 17 G/17G
POWDER, FOR SOLUTION ORAL
COMMUNITY

## 2018-02-20 NOTE — PATIENT INSTRUCTIONS
Symptoms of a Stroke     A sudden feeling of weakness on one side of your body may be a sign that you are having a stroke.   During a stroke, blood stops flowing to part of the brain. This can damage areas in the brain that control the rest of the body. Get help right away if any of these symptoms come on suddenly, even if the symptoms dont last.  Know the symptoms of a stroke  · Weakness. You may feel a sudden weakness, tingling, or a loss of feeling on 1 side of your face or body including your arm or leg.   · Vision problems. You may have sudden double vision or trouble seeing in 1 or both eyes.  · Speech problems. You may have sudden trouble talking, slurred speech, or problems understanding others.  · Headache. You may have a sudden, severe headache.  · Movement problems. You may have sudden trouble walking, dizziness, a feeling of spinning, a loss of balance, a feeling of falling, or blackouts.  · Seizure. You may also have a seizure with a large or hemorrhagic stroke.   Remember: If you have any of these symptoms, call 911 and your doctor as soon as possible.  F.A.S.T. is an easy way to remember the signs of a stroke. When you see these signs, you will know that you need to call 911 fast.   F.A.S.T. stands for:  · F is for face drooping - One side of the face is drooping or numb. When the person smiles, the smile is uneven.  · A is for arm weakness - One arm is weak or numb. When the person lifts both arms at the same time, one arm may drift downward.  · S is for speech difficulty - You may notice slurred speech or difficulty speaking. The person can't repeat a simple sentence correctly when asked.  · T is for time to dial 911 - If someone shows any of these symptoms, even if they go away, call 911 immediately. Make note of the time the symptoms first appeared.   Date Last Reviewed: 8/26/2015 © 2000-2017 [employer]. [address] [place] [address], [place] [address]. All rights reserved. This  information is not intended as a substitute for professional medical care. Always follow your healthcare professional's instructions.

## 2018-02-23 ENCOUNTER — OFFICE VISIT (OUTPATIENT)
Dept: FAMILY MEDICINE | Facility: CLINIC | Age: 83
End: 2018-02-23
Payer: MEDICARE

## 2018-02-23 VITALS
HEART RATE: 71 BPM | HEIGHT: 64 IN | BODY MASS INDEX: 21.85 KG/M2 | WEIGHT: 128 LBS | DIASTOLIC BLOOD PRESSURE: 80 MMHG | SYSTOLIC BLOOD PRESSURE: 128 MMHG | TEMPERATURE: 98 F

## 2018-02-23 DIAGNOSIS — I63.531 CEREBROVASCULAR ACCIDENT (CVA) DUE TO OCCLUSION OF RIGHT POSTERIOR CEREBRAL ARTERY: Primary | ICD-10-CM

## 2018-02-23 DIAGNOSIS — I48.0 PAROXYSMAL ATRIAL FIBRILLATION: ICD-10-CM

## 2018-02-23 DIAGNOSIS — J44.9 CHRONIC OBSTRUCTIVE PULMONARY DISEASE, UNSPECIFIED COPD TYPE: ICD-10-CM

## 2018-02-23 DIAGNOSIS — K59.00 CONSTIPATION, UNSPECIFIED CONSTIPATION TYPE: ICD-10-CM

## 2018-02-23 PROCEDURE — 99496 TRANSJ CARE MGMT HIGH F2F 7D: CPT | Mod: PBBFAC,PO | Performed by: FAMILY MEDICINE

## 2018-02-23 PROCEDURE — 99214 OFFICE O/P EST MOD 30 MIN: CPT | Mod: PBBFAC,PO | Performed by: FAMILY MEDICINE

## 2018-02-23 PROCEDURE — 99999 PR PBB SHADOW E&M-EST. PATIENT-LVL IV: CPT | Mod: PBBFAC,,, | Performed by: FAMILY MEDICINE

## 2018-02-23 PROCEDURE — 99214 OFFICE O/P EST MOD 30 MIN: CPT | Mod: S$PBB,,, | Performed by: FAMILY MEDICINE

## 2018-02-23 RX ORDER — LOVASTATIN 40 MG/1
40 TABLET ORAL NIGHTLY
COMMUNITY
End: 2018-02-23 | Stop reason: SDUPTHER

## 2018-02-23 RX ORDER — LOVASTATIN 40 MG/1
40 TABLET ORAL NIGHTLY
Qty: 90 TABLET | Refills: 3 | Status: SHIPPED | OUTPATIENT
Start: 2018-02-23

## 2018-02-23 RX ORDER — ASPIRIN 325 MG/1
325 TABLET, FILM COATED ORAL DAILY
Refills: 0 | COMMUNITY
Start: 2018-02-23 | End: 2019-02-23

## 2018-02-23 NOTE — PROGRESS NOTES
Subjective:      Patient ID: Edgardo Ashley is a 86 y.o. female.    Chief Complaint: Follow-up (hospital)      The patient was recently hospitalized at [facility] for CVA.  The discharge summary from the hospitalization is copied below for reference and completeness.      Transitional Care Note    Family and/or Caretaker present at visit?  Yes.  Diagnostic tests reviewed/disposition: No diagnosic tests pending after this hospitalization.  Disease/illness education: her daughter understands what she had.  Home health/community services discussion/referrals: Patient has home health established at [facility].   Establishment or re-establishment of referral orders for community resources: No other necessary community resources.   Discussion with other health care providers: No discussion with other health care providers necessary.           Problem List Items Addressed This Visit     None          Past Medical History:  Past Medical History:   Diagnosis Date    *Atrial fibrillation     Allergy     COPD (chronic obstructive pulmonary disease)     Heart murmur 6/17/2015    Hyperlipidemia LDL goal < 130     Hypertension     Osteoporosis      Past Surgical History:   Procedure Laterality Date    ANTERIOR COMPARTMENT DECOMPRESSION      HYSTERECTOMY       Review of patient's allergies indicates:   Allergen Reactions    No known drug allergies      Current Outpatient Prescriptions on File Prior to Visit   Medication Sig Dispense Refill    albuterol 90 mcg/actuation inhaler Inhale 2 puffs into the lungs every 6 (six) hours as needed for Wheezing. 3 Inhaler 3    CALCIUM CARBONATE (CALCIUM 600 ORAL) Every day      clopidogrel (PLAVIX) 75 mg tablet Take 75 mg by mouth once daily.      digoxin (LANOXIN) 250 mcg tablet Take 1 tablet (0.25 mg total) by mouth once daily. 90 tablet 3    docusate sodium (COLACE) 100 MG capsule Take 100 mg by mouth 3 (three) times daily as needed.       ergocalciferol (VITAMIN  D2) 50,000 unit Cap Take 1 capsule (50,000 Units total) by mouth every 7 days. 4 capsule 11    fexofenadine (ALLEGRA ALLERGY) 180 MG tablet Take 1 tablet (180 mg total) by mouth once daily. 90 tablet 3    fluticasone-salmeterol 500-50 mcg/dose (ADVAIR DISKUS) 500-50 mcg/dose DsDv diskus inhaler Inhale 1 puff into the lungs 2 (two) times daily. 180 each 3    lovastatin (MEVACOR) 20 MG tablet Take 1 tablet (20 mg total) by mouth nightly. 90 tablet 3    metoprolol tartrate (LOPRESSOR) 25 MG tablet Take 1 tablet (25 mg total) by mouth once daily. 90 tablet 3    polyethylene glycol (GLYCOLAX) 17 gram PwPk Take by mouth once daily.      vitamin E 400 UNIT capsule Every day      BOOSTRIX TDAP 2.5-8-5 Lf-mcg-Lf/0.5mL Syrg injection       [DISCONTINUED] benzonatate (TESSALON) 200 MG capsule Take 1 capsule (200 mg total) by mouth 3 (three) times daily as needed for Cough. 30 capsule 0     No current facility-administered medications on file prior to visit.      Social History     Social History    Marital status:      Spouse name: N/A    Number of children: N/A    Years of education: N/A     Occupational History    retired      Social History Main Topics    Smoking status: Never Smoker    Smokeless tobacco: Never Used    Alcohol use No    Drug use: No    Sexual activity: Not on file     Other Topics Concern    Not on file     Social History Narrative    No narrative on file     Family History   Problem Relation Age of Onset    Heart disease Mother     Arthritis Mother     Heart disease Father          Discharge Summaries  - in this encounter    Mike Morocho MD - 02/12/2018 3:50 PM CST  Formatting of this note may be different from the original.  [facility] PHYSIATRY/REHAB DISCHARGE SUMMARY    Patient Information: Edgardo Ashley  86 y.o.  female  9/1/1931  [id number]    Admitting Diagnosis:CVA (cerebral vascular accident) [I63.9]    Brief Summary of Admission History:     85 Y/O nice W/F with PMH of  HTN, HLD, A-fib (not on anticoagulation for over 7 yrs), COPD and  Dementia, was taken to ER at [facility] on 1/25/18 with sudden onset of left sided weakness, numbness and a fall with altered mental status. Urinalysis concerning for urinary tract infection, treated with Rocephin. Dr. Mireles was consulted for CVA. MRI of brain showed small acute infarct within the posterior right internal capsule adjacent the right thalamus. Family reported that pt  had been having symptoms of depression, forgetfulness, episodes of crying over the past 2 weeks. Showed abnormal UA and receiving tx for a UTI with Rocephin . ECHO showed EF of 65-75%. Dr. Diaz was consulted for chronic a.fib. She was on warfarin in the past but stopped due to bleeding problems. Pt is on Plavix now for ischemic protection. She was admitted to [facility] CMR 1/29-2/12/2018.       Hospital Course:   1. Her recent CVA from Rt side post limb of internal capsule extending to thalamus infarction made steady recovery.  She showed functional strength in left UE & LE and did not report severe paresthesia in left side.  2. She can a]walk up to 400 ft with SPV and static standing balance is good.  3. Depression; Recovering with more realistic view and adjustment. Excellent family support.  4. Lab on 2/5/18; WBC/Hgb/Hct/Plt-8.0/11.7/32.9/274 Glu/BUN/Cr/Na/K/Ca-100/23/0.91/139/4.1/9.  5. Will DC her home to live with her daughter and HH PT/OT/ST/RN/Aide/SW through [facility]  arranged.  6. Will see her PCP (Dr. Hugo Heck), Dr. Edmundo Mireles and me for F/U.    I discussed with the patient and the family disease process and treatment.     I have personally seen and examined the patient, Edgardo Ashley, in a face to face encounter on the date of discharge.     She is cleared for discharge with instructions to follow up as directed.   Total time in the care and discharge planning of this patient was greater than 30 minutes.    Discharge Condition: Stable.    Follow  Up Visit: Mike Morocho MD  [address] [facility]  [address]  [place] [address]  [phone]    Schedule an appointment as soon as possible for a visit in 8 week(s)    Tracy Mireles MD  [address] [facility] [address]  [place] [address]  [phone]    Schedule an appointment as soon as possible for a visit in 4 week(s)    Refugio Heck MD  [address] [place] [address]  [phone]    Go on 2018  F/U @ 10:40 AM    Please bring all D/C paperwork and prescription medications to this appointment. If the time/date is not convenient for you, please feel free to reschedule at any time.    [facility]  [address]  [facility] [phone]  Follow up  Please fax DC to [phone]    Laboratory Findings and X-Rays:  BMP: No results for input(s): GLUCOSE, BUN, CREATININE, SODIUM, POTASSIUM, CHLORIDE, CO2, CALCIUM, ANIONGAP in the last 168 hours.  CBC:  No results for input(s): WBC, RBC, HGB, HEMATOCRIT, PLT, MCV, MCH, MCHC in the last 168 hours.  No results found.    The patient had advanced to the following levels of independence in therapy:    LEVELS OF INDEPENDENCE   1 = Dependent  2 = Maximum Assistance  3 = Moderate Assistance  4 = Minimum Assistance  5 = Standby Assistance  6 = Modified Assistance  7 = Independent    PHYSICAL THERAPY TREATMENT PLAN AND DISCHARGE SUMMARY:     THERAPY   LEVEL OF INDEPENDENCE   ADMIT    LEVEL OF INDEPENDENCE  DISCHARGE    Transfers: 2 5   Gait: 1 5   Equipment Rolling walker Rolling walker   Distance 28 [phone]   Wheelchair: 1 5   Stairs: 1 5   Problem Solvin 4   Social Interaction: 3 7   Procedural Memory: 1 4     OCCUPATIONAL THERAPY TREATMENT PLAN AND DISCHARGE SUMMARY:    THERAPY   LEVEL OF INDEPENDENCE   ADMIT    LEVEL OF INDEPENDENCE  DISCHARGE    Feeding and Eatin 6   Upper Extremity Dressing: 3 6   Lower Extremity Dressin 5   Toiletin 5   Bathing: 3 5   Grooming/Hygiene: 5 7   Toilet/Bedside Commode Transfer: 2 5   Tub  Transfer: 0 0   Shower Transfer: 3 5   Problem Solvin 4   Social Interaction: 3 7   Procedural Memory: 1 4   Juan Cognitive Level: ACLS Score: (not recorded) ACLS Score: (not recorded)     SPEECH THERAPY TREATMENT PLAN AND DISCHARGE SUMMARY:    THERAPY   LEVEL OF INDEPENDENCE   ADMIT    LEVEL OF INDEPENDENCE  DISCHARGE    Memory: 1 4   Comprehension: 2 6   Expression: 3 7   Problem Solvin 4   Social Interaction: 3 7     Discharge Medications:   Discharge Medication List as of 2018 10:50 AM     START taking these medications   Details   cetirizine 10 MG tablet Take 1 tablet (10 mg total) by mouth daily., Starting 2018, Until Thu 3/15/2018, Normal       CONTINUE these medications which have CHANGED   Details   digoxin 0.25 MG tablet Take 1 tablet (250 mcg total) by mouth daily., Starting 2018, Until Thu 3/15/2018, Normal     docusate sodium 100 MG capsule Take 1 capsule (100 mg total) by mouth 2 (two) times daily., Starting 2018, Until 2018, Normal     polyethylene glycol 17 gram packet Take 17 g by mouth daily., Starting 2018, Until 2018, Normal       CONTINUE these medications which have NOT CHANGED   Details   calcium-vitamin D (OSCAL) 250 (625)-125 mg-unit per tablet Take 1 tablet by mouth daily., Historical Med     clopidogrel (PLAVIX) 75 mg tablet Take 1 tablet (75 mg total) by mouth daily., Starting 2018, No Print     ergocalciferol (VITAMIN D2) 50,000 unit capsule Take 50,000 Units by mouth once a week., Historical Med     fluticasone-salmeterol (ADVAIR DISKUS) 500-50 mcg/dose DsDv Inhale 1 puff into the lungs daily., Until Discontinued, Historical Med     lovastatin (MEVACOR) 40 MG tablet Take 1 tablet (40 mg total) by mouth nightly., Starting 2018, No Print     metoprolol tartrate (LOPRESSOR) 25 MG tablet Take 25 mg by mouth daily., Historical Med     vitamin E 400 UNIT capsule Take 400 Units by mouth daily., Starting Wed  7/6/2011, Historical Med       STOP taking these medications     fexofenadine (ALLEGRA) 180 MG tablet         Allergies at Discharge: No Known Allergies    Discharge Orders:   Discharge Orders   Future Labs/Procedures Expected by Expires   Basic metabolic panel 2/19/2018 2/12/2019   Questions:   [facility] Has the patient fasted?:   CBC and differential 2/19/2018 2/12/2019   Questions:   [facility] Container Type:   Activity as tolerated As directed   Diet (Select type) Cardiac/Low Chol/KEEGAN; Chopped meats (gravy on meats). Ensure with all meals (van). Ice-cream with supper., NO rice. As directed   Questions:   Diet Type: Cardiac/Low Chol/KEEGAN   Other Restriction(s):   Liquid Consistency:   Sodium Restriction:   Fluid restriction:   Preferences: Chopped meats (gravy on meats). Ensure with all meals (van). Ice-cream with supper.   NO rice.   Fall precautions As directed   Modified Independent Activity As directed       Discharge Therapies: See above.    Discharge Equipment: See note.    Discharge Problem List:   Patient Active Problem List   Diagnosis Date Noted    Impaired mobility and ADLs 01/30/2018    CVA (cerebral vascular accident) 01/29/2018    Chronic a-fib 01/29/2018    Secondary hypertension 01/29/2018    Other hyperlipidemia 01/29/2018    Acute ischemic stroke 01/25/2018             Medications at Time of Discharge  - as of this encounter      Medication Sig. Disp. Refills Start Date End Date   calcium-vitamin D (OSCAL) 250 (625)-125 mg-unit per tablet   Take 1 tablet by mouth daily.           cetirizine 10 MG tablet   Take 1 tablet (10 mg total) by mouth daily. 30 tablet   0 02/13/2018 03/15/2018   clopidogrel (PLAVIX) 75 mg tablet   Take 1 tablet (75 mg total) by mouth daily.     01/30/2018     digoxin 0.25 MG tablet   Take 1 tablet (250 mcg total) by mouth daily. 30 tablet   0 02/13/2018 03/15/2018   ergocalciferol (VITAMIN D2) 50,000 unit capsule   Take 50,000 Units by mouth once a week.            fluticasone-salmeterol (ADVAIR DISKUS) 500-50 mcg/dose DsDv   Inhale 1 puff into the lungs daily.           lovastatin (MEVACOR) 40 MG tablet   Take 1 tablet (40 mg total) by mouth nightly.     01/29/2018     metoprolol tartrate (LOPRESSOR) 25 MG tablet   Take 25 mg by mouth daily.           vitamin E 400 UNIT capsule   Take 400 Units by mouth daily.     07/06/2011     docusate sodium 100 MG capsule   Take 1 capsule (100 mg total) by mouth 2 (two) times daily. 20 capsule   0 02/12/2018 02/22/2018   polyethylene glycol 17 gram packet   Take 17 g by mouth daily. 3 packet   0 02/13/2018 02/16/2018     Ordered Prescriptions  - in this encounter      Prescription Sig. Disp. Refills Start Date End Date   digoxin 0.25 MG tablet   Take 1 tablet (250 mcg total) by mouth daily. 30 tablet   0 02/13/2018 03/15/2018   cetirizine 10 MG tablet   Take 1 tablet (10 mg total) by mouth daily. 30 tablet   0 02/13/2018 03/15/2018   polyethylene glycol 17 gram packet   Take 17 g by mouth daily. 3 packet   0 02/13/2018 02/16/2018   docusate sodium 100 MG capsule   Take 1 capsule (100 mg total) by mouth 2 (two) times daily. 20 capsule   0 02/12/2018 02/22/2018     Discharge Disposition  - in this encounter      Disposition Code Departure Means Destination   Home or Self Care     Home     Progress Notes  - in this encounter        She is now on plavix. She had a MAJOR bleed in the past in her hip that was not related to any fall.  She is falling now. Her daughter had great concerns over the use of plavix as it was felt to be the cause of her bleed in the past and she had been off of plavix for 15 years.  She had been on 81 mg asa prior to this stroke.  We settled on changing her to 325 mg ASa.      Problem List Items Addressed This Visit     Atrial fibrillation - Primary    Overview     She has chronic Afib and is not on anticoagulation due to risk of falls.  She has been stable for years on her digoxin and is rate controlled.  She gets  the levels checked at times.          Cerebrovascular accident (CVA) due to occlusion of right posterior cerebral artery    Overview     She had a CVA from Rt side post limb of internal capsule extending to thalamus infarction made steady recovery.  She showed functional strength in left UE & LE and did not report severe paresthesia in left side.         Constipation    Overview     She has a chronic constipation and she has been on miralax at home and in the hospital.  She has had good control with the miralax.          COPD (chronic obstructive pulmonary disease)    Overview     The patient presents with COPD.  The patient denies chest pain, palpitations, shortness of breath, difficulty breathing, and wheezing.  The patient feels that the pattern of symptoms is stable.  Current treatment has included advair daily and spiriva and she has albuterol but never uses this.  the insurance is not going to cover her spiriva and needs to do step therapy.    .              She feels like she is about 90% controlled with her strength and she is getting PT and ST and OT and she is using a walker now.     Health Maintenance Due   Topic Date Due    Zoster Vaccine  09/01/1991       Past Medical History:  Past Medical History:   Diagnosis Date    *Atrial fibrillation     Allergy     Cerebrovascular accident (CVA) due to occlusion of right posterior cerebral artery 2/23/2018    Constipation 2/23/2018    She has a chronic constipation and she has been on miralax at home and in the hospital.  She has had good control with the miralax.     COPD (chronic obstructive pulmonary disease)     Heart murmur 6/17/2015    Hyperlipidemia LDL goal < 130     Hypertension     Osteoporosis      Past Surgical History:   Procedure Laterality Date    ANTERIOR COMPARTMENT DECOMPRESSION      HYSTERECTOMY       Review of patient's allergies indicates:   Allergen Reactions    No known drug allergies      Current Outpatient Prescriptions on  File Prior to Visit   Medication Sig Dispense Refill    albuterol 90 mcg/actuation inhaler Inhale 2 puffs into the lungs every 6 (six) hours as needed for Wheezing. 3 Inhaler 3    CALCIUM CARBONATE (CALCIUM 600 ORAL) Every day      digoxin (LANOXIN) 250 mcg tablet Take 1 tablet (0.25 mg total) by mouth once daily. 90 tablet 3    docusate sodium (COLACE) 100 MG capsule Take 100 mg by mouth 3 (three) times daily as needed.       ergocalciferol (VITAMIN D2) 50,000 unit Cap Take 1 capsule (50,000 Units total) by mouth every 7 days. 4 capsule 11    fexofenadine (ALLEGRA ALLERGY) 180 MG tablet Take 1 tablet (180 mg total) by mouth once daily. 90 tablet 3    fluticasone-salmeterol 500-50 mcg/dose (ADVAIR DISKUS) 500-50 mcg/dose DsDv diskus inhaler Inhale 1 puff into the lungs 2 (two) times daily. 180 each 3    metoprolol tartrate (LOPRESSOR) 25 MG tablet Take 1 tablet (25 mg total) by mouth once daily. 90 tablet 3    polyethylene glycol (GLYCOLAX) 17 gram PwPk Take by mouth once daily.      vitamin E 400 UNIT capsule Every day      [DISCONTINUED] clopidogrel (PLAVIX) 75 mg tablet Take 75 mg by mouth once daily.      BOOSTRIX TDAP 2.5-8-5 Lf-mcg-Lf/0.5mL Syrg injection       [DISCONTINUED] benzonatate (TESSALON) 200 MG capsule Take 1 capsule (200 mg total) by mouth 3 (three) times daily as needed for Cough. 30 capsule 0    [DISCONTINUED] lovastatin (MEVACOR) 20 MG tablet Take 1 tablet (20 mg total) by mouth nightly. 90 tablet 3     No current facility-administered medications on file prior to visit.      Social History     Social History    Marital status:      Spouse name: N/A    Number of children: N/A    Years of education: N/A     Occupational History    retired      Social History Main Topics    Smoking status: Never Smoker    Smokeless tobacco: Never Used    Alcohol use No    Drug use: No    Sexual activity: Not on file     Other Topics Concern    Not on file     Social History Narrative  "   No narrative on file     Family History   Problem Relation Age of Onset    Heart disease Mother     Arthritis Mother     Heart disease Father                Review of Systems   Constitutional: Negative for fever.   HENT: Positive for postnasal drip.    Respiratory: Negative for cough, shortness of breath and wheezing.    Cardiovascular: Negative for chest pain and palpitations.   Gastrointestinal: Negative for abdominal distention, blood in stool, diarrhea, nausea and vomiting.   Genitourinary: Negative for dysuria and hematuria.       Objective:     /80   Pulse 71   Temp 98 °F (36.7 °C) (Oral)   Ht 5' 4" (1.626 m)   Wt 58.1 kg (128 lb)   BMI 21.97 kg/m²     Physical Exam   Constitutional: She appears well-developed and well-nourished.   HENT:   Head: Normocephalic and atraumatic.   Eyes: EOM are normal. Pupils are equal, round, and reactive to light.   Neck: Normal range of motion. Neck supple.   Cardiovascular: Normal rate.  An irregularly irregular rhythm present.   Murmur heard.  Pulmonary/Chest: Effort normal and breath sounds normal. No respiratory distress. She has no wheezes. She has no rales.   Abdominal: Soft.   Musculoskeletal: She exhibits edema. She exhibits no deformity.   Neurological: She is alert.   Psychiatric: Cognition and memory are impaired.     Assessment:     1. Cerebrovascular accident (CVA) due to occlusion of right posterior cerebral artery    2. Paroxysmal atrial fibrillation    3. Constipation, unspecified constipation type    4. Chronic obstructive pulmonary disease, unspecified COPD type        Plan:     Problem List Items Addressed This Visit     None      Edgardo was seen today for follow-up.    Diagnoses and all orders for this visit:    Cerebrovascular accident (CVA) due to occlusion of right posterior cerebral artery    Paroxysmal atrial fibrillation    Constipation, unspecified constipation type    Chronic obstructive pulmonary disease, unspecified COPD " type    Other orders  -     aspirin (BUFFERIN) 325 MG Tab; Take 1 tablet (325 mg total) by mouth once daily.  -     lovastatin (MEVACOR) 40 MG tablet; Take 1 tablet (40 mg total) by mouth every evening.      I have discontinued Ms. Ashley's lovastatin, benzonatate, and clopidogrel. I have also changed her lovastatin. Additionally, I am having her start on aspirin. Lastly, I am having her maintain her CALCIUM CARBONATE (CALCIUM 600 ORAL), vitamin E, docusate sodium, albuterol, fexofenadine, BOOSTRIX TDAP, fluticasone-salmeterol 500-50 mcg/dose, metoprolol tartrate, digoxin, ergocalciferol, and polyethylene glycol.  She is to stay on the 40 mg lovastatin and not the 20 mg lovastatin. Stop the plavix and start a 325 mg asa.     No Follow-up on file.

## 2018-03-08 ENCOUNTER — TELEPHONE (OUTPATIENT)
Dept: FAMILY MEDICINE | Facility: CLINIC | Age: 83
End: 2018-03-08

## 2018-03-12 ENCOUNTER — NURSE TRIAGE (OUTPATIENT)
Dept: ADMINISTRATIVE | Facility: CLINIC | Age: 83
End: 2018-03-12

## 2018-03-12 NOTE — TELEPHONE ENCOUNTER
Pt daughter said she cant come in until Friday. She states the patient was just in to see you and was wanting to know you could up her bp med and have home health nurse check it.

## 2018-03-12 NOTE — TELEPHONE ENCOUNTER
Has the home heatlh checked this blood pressure and obtained a range on her?  I need to feel comfortable that this is an accurate blood pressure prior to increasing a medicine. The phone message did not mention that these blood pressures that were high were obtained by a home health nurse.  Please call the home health nurse and get her to go to the patient's house and get a blood pressure that I can rely on them that I can make a decision to change a medicine at that point.  The patient does need follow-up in the office.

## 2018-03-12 NOTE — TELEPHONE ENCOUNTER
pts current BP is 168/86. Denies any symptoms. Advised to see PCP within 2 weeks    Reason for Disposition   BP > 160/100    Protocols used: ST HIGH BLOOD PRESSURE-A-OH

## 2018-03-13 NOTE — TELEPHONE ENCOUNTER
Spoke w/Jacki at [facility] and she states that on 3/7/18 the pt's B/P was 132/70, 3/8/18 it was 128/72, 3/12/18 it was 168/86. She is faxing over a log of the pt's B/P's. I have spoken to the daughter and let her know that we are checking with  to get additional information as well. Informed that we will call her back with any decisions made. Daughter also states that the B/P 160/100 is not correct. Please advise.

## 2018-03-14 ENCOUNTER — TELEPHONE (OUTPATIENT)
Dept: FAMILY MEDICINE | Facility: CLINIC | Age: 83
End: 2018-03-14

## 2018-03-14 NOTE — TELEPHONE ENCOUNTER
----- Message from Eleanor Hills sent at 3/14/2018  9:59 AM CDT -----  Contact: Martin FINLEY  He's calling stating that pt is being discharged from physical therapy, please advise [phone]

## 2018-05-29 DIAGNOSIS — J44.9 CHRONIC OBSTRUCTIVE PULMONARY DISEASE, UNSPECIFIED COPD TYPE: ICD-10-CM

## 2018-05-29 RX ORDER — MINERAL OIL
ENEMA (ML) RECTAL
Qty: 90 TABLET | Refills: 0 | Status: SHIPPED | OUTPATIENT
Start: 2018-05-29

## 2018-06-14 ENCOUNTER — TELEPHONE (OUTPATIENT)
Dept: FAMILY MEDICINE | Facility: CLINIC | Age: 83
End: 2018-06-14

## 2018-06-14 NOTE — TELEPHONE ENCOUNTER
----- Message from Gabino Humphrey sent at 6/14/2018  3:47 PM CDT -----  Contact: Daughter  Call pt at [phone] or  [phone]   Pt is having shortness of breath and weakness and would like to see if she can be seen sooner than 07/30/2018 available appt date.  Pt only wants to see Dr. Heck

## 2018-06-14 NOTE — TELEPHONE ENCOUNTER
Spoke with pt's daughter, she states pt is hallucinating, decreased appetite, angry, combative and fatigue. She states she is not really SOB and her breathing is not any different than it usually is with her COPD. Advised Dr. Heck's next available is 06/22/18 but if she would like sooner we can schedule pt with a NP. She refused an appt with an NP and scheduled with Dr. Heck 06/22/18 @ 11:20. Also advised if symptoms worse and pt starts having sob to go to the ER.

## 2018-06-19 ENCOUNTER — TELEPHONE (OUTPATIENT)
Dept: FAMILY MEDICINE | Facility: CLINIC | Age: 83
End: 2018-06-19

## 2018-06-19 NOTE — TELEPHONE ENCOUNTER
----- Message from Rita Swann sent at 6/19/2018 12:27 PM CDT -----  Contact: patient  Calling concerning speaking with provider before patient's  appointment. Very important please. Please call Nanci THOMAS @ [phone]. Thanks, arabella

## 2018-06-21 NOTE — TELEPHONE ENCOUNTER
Her mother is having serious symptoms of having increased disorientation and there are days that she does not know where she is at and she does not know where things are in her home.  She is also choking on things and she coughs all of the time. We discussed the possibility of aspiration pneumonia.  She has tightening of her neck at times and she has been complaining of having spasms in her hands and legs.  Personality wise, she is off.  Some days she is aggressive or passive aggressive.   She has had a foul change in her urine also. She has not had fever.     After much discussion, I recommended that since she has had a rapid change in her neuro status that she should be seen in the ER tonight.  She is going to bring her in.

## 2018-06-22 ENCOUNTER — TELEPHONE (OUTPATIENT)
Dept: FAMILY MEDICINE | Facility: CLINIC | Age: 83
End: 2018-06-22

## 2018-06-22 ENCOUNTER — OFFICE VISIT (OUTPATIENT)
Dept: FAMILY MEDICINE | Facility: CLINIC | Age: 83
End: 2018-06-22
Payer: MEDICARE

## 2018-06-22 VITALS
WEIGHT: 131.19 LBS | HEART RATE: 73 BPM | HEIGHT: 66 IN | SYSTOLIC BLOOD PRESSURE: 160 MMHG | BODY MASS INDEX: 21.08 KG/M2 | TEMPERATURE: 98 F | DIASTOLIC BLOOD PRESSURE: 80 MMHG

## 2018-06-22 DIAGNOSIS — R05.9 COUGHING: ICD-10-CM

## 2018-06-22 DIAGNOSIS — I10 ESSENTIAL HYPERTENSION: ICD-10-CM

## 2018-06-22 DIAGNOSIS — F03.90 DEMENTIA WITHOUT BEHAVIORAL DISTURBANCE, UNSPECIFIED DEMENTIA TYPE: Primary | ICD-10-CM

## 2018-06-22 DIAGNOSIS — M79.674 PAIN IN TOES OF BOTH FEET: ICD-10-CM

## 2018-06-22 DIAGNOSIS — R13.19 OTHER DYSPHAGIA: ICD-10-CM

## 2018-06-22 DIAGNOSIS — M79.675 PAIN IN TOES OF BOTH FEET: ICD-10-CM

## 2018-06-22 DIAGNOSIS — T17.308A CHOKING, INITIAL ENCOUNTER: ICD-10-CM

## 2018-06-22 PROCEDURE — 99214 OFFICE O/P EST MOD 30 MIN: CPT | Mod: S$PBB,,, | Performed by: FAMILY MEDICINE

## 2018-06-22 PROCEDURE — 99214 OFFICE O/P EST MOD 30 MIN: CPT | Mod: PBBFAC,PO | Performed by: FAMILY MEDICINE

## 2018-06-22 PROCEDURE — 99999 PR PBB SHADOW E&M-EST. PATIENT-LVL IV: CPT | Mod: PBBFAC,,, | Performed by: FAMILY MEDICINE

## 2018-06-22 RX ORDER — DONEPEZIL HYDROCHLORIDE 5 MG/1
5 TABLET, FILM COATED ORAL NIGHTLY
Qty: 30 TABLET | Refills: 11 | Status: SHIPPED | OUTPATIENT
Start: 2018-06-22 | End: 2019-06-22

## 2018-06-22 RX ORDER — METOPROLOL TARTRATE 25 MG/1
25 TABLET, FILM COATED ORAL 2 TIMES DAILY
Qty: 180 TABLET | Refills: 3 | Status: SHIPPED | OUTPATIENT
Start: 2018-06-22

## 2018-06-22 RX ORDER — BENZONATATE 200 MG/1
200 CAPSULE ORAL 3 TIMES DAILY PRN
Qty: 60 CAPSULE | Refills: 0 | Status: SHIPPED | OUTPATIENT
Start: 2018-06-22

## 2018-06-22 NOTE — TELEPHONE ENCOUNTER
----- Message from Cedric Bearden sent at 6/22/2018  3:53 PM CDT -----  ..1. What is the name of the medication you are requesting? Benoxinate   2. What is the dose? 200 mgs   3. How do you take the medication? Orally, topically, etc? Orally   4. How often do you take this medication?   5. Do you need a 30 day or 90 day supply?30 day supply   6. How many refills are you requesting?   7. What is your preferred pharmacy and location of the pharmacy?     ..  [facility] [place]  [address] [address]  Phone: [phone] Fax: [phone]      8. Who can we contact with further questions? Pt at..[phone] (home) [phone] (work)

## 2018-06-22 NOTE — TELEPHONE ENCOUNTER
I have signed for the following orders AND/OR meds.  Please call the patient and ask the patient to schedule the testing AND/OR inform about any medications that were sent.      No orders of the defined types were placed in this encounter.        Medications Ordered This Encounter      benzonatate (TESSALON) 200 MG capsule          Sig: Take 1 capsule (200 mg total) by mouth 3 (three) times daily as needed for Cough.          Dispense:  60 capsule          Refill:  0

## 2018-06-22 NOTE — PROGRESS NOTES
Subjective:      Patient ID: Edgardo Ashley is a 86 y.o. female.    Chief Complaint: Fatigue; Anorexia; and Hallucinations    HPI    86-year-old female, brought in by daughters.  She has had a rapid change with marked marked dementia, with flat affect, and she went to the ER 2 night ago when directed. She had a CT with no acute changes and all of her blood was stable without major findings. She had a neg cxr and a negative urine for infection. She is having problems with disorientation with this and she is not eating the best.  She has had hallucinations along with being inaware of where she is. She has had anger depression and passive aggressive. She talks to herself. She is always lookgin for something or some else. She cries.  She is delusional about the past.  Someone is murray wher all of the time.  She can't find toilet in her rooms at times.     She has had muscle spasms noted.   She has a poor appatite.     She has been choking and she has been coughing a lot.  She has had some weight loss.     No fever. No eye drainage. No sore throat. No cough. No chest pain or shortness of breath or abdominal pain. Possible dysuria. No calf pain or swelling. No rash or bruising, occasional 1 or 2 bruises of about 1 cm or less No back pain. Awake and no syncope, but behavior problems, with tries to answer questions occasionally but is just very flat and occasional words      Problem List Items Addressed This Visit     Hypertension    Overview     The patient presents with essential hypertension.  The patient is tolerating the medication well and is in excellent compliance.  The patient is experiencing no side effects.  Counseling was offered regarding low salt diets.  The patient has a reduced salt intake.  The patient denies chest pain, palpitations, shortness of breath, dyspnea on exertion, left or murmur neck pain, nausea, vomiting, diaphoresis, paroxysmal nocturnal dyspnea, and orthopnea.   /82   Pulse (!) 55    "Temp 98.3 °F (36.8 °C) (Oral)   Ht 5' 4" (1.626 m)   Wt 59.5 kg (131 lb 2.8 oz)   BMI 22.52 kg/m²              Other Visit Diagnoses     Dementia without behavioral disturbance, unspecified dementia type    -  Primary    Choking, initial encounter        Coughing        Other dysphagia              Review of Systems   Constitutional: Positive for fatigue. Negative for chills and fever.   Respiratory: Positive for cough and choking. Negative for shortness of breath and wheezing.    Cardiovascular: Negative for chest pain and palpitations.   Gastrointestinal: Negative for abdominal pain, constipation, diarrhea, nausea and vomiting.   Genitourinary: Negative for dysuria and hematuria.   Psychiatric/Behavioral: Positive for agitation, confusion, decreased concentration, dysphoric mood and sleep disturbance.       Objective:   BP (!) 160/80   Pulse 73   Temp 98.4 °F (36.9 °C)   Ht 5' 6" (1.676 m)   Wt 59.5 kg (131 lb 3.2 oz)   BMI 21.18 kg/m²     Physical Exam   Constitutional: She appears well-developed and well-nourished.   HENT:   Head: Normocephalic and atraumatic.   Eyes: EOM are normal. Pupils are equal, round, and reactive to light.   Neck: Normal range of motion. Neck supple.   Cardiovascular: Normal rate and regular rhythm.    Murmur heard.  Pulmonary/Chest: Effort normal and breath sounds normal. No respiratory distress. She has no wheezes. She has no rales.   Abdominal: Soft. Bowel sounds are normal.   Neurological: She is alert.   Psychiatric: Her mood appears not anxious. Her speech is delayed. She is slowed. Cognition and memory are impaired.   Oriented to person.       Assessment:     1. Dementia without behavioral disturbance, unspecified dementia type    2. Choking, initial encounter    3. Coughing    4. Other dysphagia    5. Essential hypertension        Plan:   Edgardo was seen today for fatigue, anorexia and hallucinations.    Diagnoses and all orders for this visit:    Dementia without " behavioral disturbance, unspecified dementia type  -     donepezil (ARICEPT) 5 MG tablet; Take 1 tablet (5 mg total) by mouth every evening.  -     Ambulatory referral to Neurology    Choking, initial encounter  -     Ambulatory Referral to Speech Therapy    Coughing  -     Ambulatory Referral to Speech Therapy    Other dysphagia  -     Ambulatory referral to Gastroenterology    Essential hypertension  -     metoprolol tartrate (LOPRESSOR) 25 MG tablet; Take 1 tablet (25 mg total) by mouth 2 (two) times daily.      Refer to neurology for further evaluation on her dementia.  She is having care 24/7 now.   Get evaluated for her swallowing difficulty.   Increase bp meds.   Recheck in 1 mo.

## 2018-07-13 ENCOUNTER — TELEPHONE (OUTPATIENT)
Dept: NEUROLOGY | Facility: CLINIC | Age: 83
End: 2018-07-13

## 2018-07-16 ENCOUNTER — TELEPHONE (OUTPATIENT)
Dept: ADMINISTRATIVE | Facility: CLINIC | Age: 83
End: 2018-07-16

## 2018-07-16 NOTE — TELEPHONE ENCOUNTER
Home Health SOC 02/13/2018 to 04/13/2018 with [facility] of Dr Refugio Roberts. SN, ST service